# Patient Record
Sex: FEMALE | Race: BLACK OR AFRICAN AMERICAN | NOT HISPANIC OR LATINO
[De-identification: names, ages, dates, MRNs, and addresses within clinical notes are randomized per-mention and may not be internally consistent; named-entity substitution may affect disease eponyms.]

---

## 2019-05-09 ENCOUNTER — APPOINTMENT (OUTPATIENT)
Dept: OBGYN | Facility: CLINIC | Age: 34
End: 2019-05-09
Payer: COMMERCIAL

## 2019-05-09 VITALS
BODY MASS INDEX: 33.27 KG/M2 | SYSTOLIC BLOOD PRESSURE: 110 MMHG | HEIGHT: 66 IN | WEIGHT: 207 LBS | DIASTOLIC BLOOD PRESSURE: 80 MMHG

## 2019-05-09 PROCEDURE — 99395 PREV VISIT EST AGE 18-39: CPT

## 2019-05-09 NOTE — PHYSICAL EXAM
[Alert] : alert [Acute Distress] : no acute distress [Awake] : awake [Nipple Discharge] : no nipple discharge [Mass] : no breast mass [Axillary LAD] : no axillary lymphadenopathy [Tender] : non tender [Soft] : soft [Oriented x3] : oriented to person, place, and time [Normal] : uterus [No Bleeding] : there was no active vaginal bleeding [IUD String] : had an IUD string protruding out [Uterine Adnexae] : were not tender and not enlarged

## 2019-05-09 NOTE — HISTORY OF PRESENT ILLNESS
[Definite:  ___ (Date)] : the last menstrual period was [unfilled] [Normal Amount/Duration] : was of a normal amount and duration [Regular Cycle Intervals] : periods have been regular [Contraception] : uses contraception [IUD] : has an intrauterine device [1 Year Ago] : 1 year ago [Healthy Diet] : a healthy diet [Good] : being in good health [Regular Exercise] : regular exercise [Menstrual Problems] : reports normal menses [Weight Concerns] : no concerns with her weight [Up to Date] : up to date with ~his/her~ STD screening [de-identified] : paragard [Spotting Between  Menses] : no spotting between menses

## 2019-05-14 LAB — CYTOLOGY CVX/VAG DOC THIN PREP: NORMAL

## 2019-06-09 ENCOUNTER — TRANSCRIPTION ENCOUNTER (OUTPATIENT)
Age: 34
End: 2019-06-09

## 2020-10-12 ENCOUNTER — APPOINTMENT (OUTPATIENT)
Dept: OBGYN | Facility: CLINIC | Age: 35
End: 2020-10-12
Payer: COMMERCIAL

## 2020-10-12 VITALS
HEIGHT: 66 IN | WEIGHT: 193 LBS | SYSTOLIC BLOOD PRESSURE: 112 MMHG | BODY MASS INDEX: 31.02 KG/M2 | DIASTOLIC BLOOD PRESSURE: 70 MMHG

## 2020-10-12 PROCEDURE — 99213 OFFICE O/P EST LOW 20 MIN: CPT

## 2020-10-12 NOTE — HISTORY OF PRESENT ILLNESS
[TextBox_4] : 35 yo pt presents for 4 days of "pelvic pinching" and spotting. Paragard IUD in place. Has not checked the strings. Denies painful intercourse.

## 2020-10-12 NOTE — PLAN
[FreeTextEntry1] : IUD in place patient reassured. Will continue to monitor symptoms and rto if not improved, all questions answered

## 2021-05-21 ENCOUNTER — NON-APPOINTMENT (OUTPATIENT)
Age: 36
End: 2021-05-21

## 2021-05-21 ENCOUNTER — APPOINTMENT (OUTPATIENT)
Dept: OBGYN | Facility: CLINIC | Age: 36
End: 2021-05-21
Payer: COMMERCIAL

## 2021-05-21 VITALS
WEIGHT: 195.88 LBS | BODY MASS INDEX: 31.62 KG/M2 | DIASTOLIC BLOOD PRESSURE: 60 MMHG | SYSTOLIC BLOOD PRESSURE: 100 MMHG

## 2021-05-21 PROCEDURE — 99072 ADDL SUPL MATRL&STAF TM PHE: CPT

## 2021-05-21 PROCEDURE — 99395 PREV VISIT EST AGE 18-39: CPT

## 2021-05-21 NOTE — HISTORY OF PRESENT ILLNESS
[Patient reported PAP Smear was normal] : Patient reported PAP Smear was normal [Normal Amount/Duration] :  normal amount and duration [Regular Cycle Intervals] : periods have been regular [Frequency: Q ___ days] : menstrual periods occur approximately every [unfilled] days [Menstrual Cramps] : menstrual cramps [Currently Active] : currently active [Men] : men [No] : No [Yes] : Yes [PapSmeardate] : 5/9/2019 [FreeTextEntry1] : 5/9/2021 [FreeTextEntry3] : Paragard 10 IUD

## 2021-05-23 LAB — HPV HIGH+LOW RISK DNA PNL CVX: NOT DETECTED

## 2021-05-25 LAB — CYTOLOGY CVX/VAG DOC THIN PREP: NORMAL

## 2022-04-18 ENCOUNTER — APPOINTMENT (OUTPATIENT)
Dept: OBGYN | Facility: CLINIC | Age: 37
End: 2022-04-18
Payer: COMMERCIAL

## 2022-04-18 VITALS
BODY MASS INDEX: 33.25 KG/M2 | OXYGEN SATURATION: 98 % | WEIGHT: 206 LBS | HEART RATE: 87 BPM | DIASTOLIC BLOOD PRESSURE: 89 MMHG | SYSTOLIC BLOOD PRESSURE: 130 MMHG

## 2022-04-18 DIAGNOSIS — Z30.431 ENCOUNTER FOR ROUTINE CHECKING OF INTRAUTERINE CONTRACEPTIVE DEVICE: ICD-10-CM

## 2022-04-18 DIAGNOSIS — Z30.430 ENCOUNTER FOR INSERTION OF INTRAUTERINE CONTRACEPTIVE DEVICE: ICD-10-CM

## 2022-04-18 DIAGNOSIS — Z01.419 ENCOUNTER FOR GYNECOLOGICAL EXAMINATION (GENERAL) (ROUTINE) W/OUT ABNORMAL FINDINGS: ICD-10-CM

## 2022-04-18 PROCEDURE — 99395 PREV VISIT EST AGE 18-39: CPT

## 2022-04-18 NOTE — HISTORY OF PRESENT ILLNESS
[Patient reported PAP Smear was normal] : Patient reported PAP Smear was normal [Frequency: Q ___ days] : menstrual periods occur approximately every [unfilled] days [Menstrual Cramps] : menstrual cramps [Currently Active] : currently active [Men] : men [PapSmeardate] : 05/21/2021 [FreeTextEntry1] : 04/01/2022 [No] : Patient does not have concerns regarding sex

## 2022-04-18 NOTE — COUNSELING
[Breast Self Exam] : breast self exam [Preconception Care/ Fertility options] : preconception care, fertility options [Pregnancy Options] : pregnancy options

## 2022-04-20 LAB — HPV HIGH+LOW RISK DNA PNL CVX: NOT DETECTED

## 2022-04-25 LAB — CYTOLOGY CVX/VAG DOC THIN PREP: NORMAL

## 2022-04-28 ENCOUNTER — APPOINTMENT (OUTPATIENT)
Dept: OBGYN | Facility: CLINIC | Age: 37
End: 2022-04-28

## 2022-05-02 ENCOUNTER — APPOINTMENT (OUTPATIENT)
Dept: OBGYN | Facility: CLINIC | Age: 37
End: 2022-05-02

## 2022-05-03 LAB
ESTRADIOL SERPL-MCNC: 58 PG/ML
FSH SERPL-MCNC: 5.7 IU/L
HCG SERPL-MCNC: <1 MIU/ML
LH SERPL-ACNC: 2.9 IU/L
PROGEST SERPL-MCNC: 0.2 NG/ML
PROLACTIN SERPL-MCNC: 18.1 NG/ML
T3 SERPL-MCNC: 99 NG/DL
T3FREE SERPL-MCNC: 2.34 PG/ML
T4 FREE SERPL-MCNC: 1 NG/DL
T4 SERPL-MCNC: 6.5 UG/DL
TSH SERPL-ACNC: 1.32 UIU/ML

## 2022-05-09 DIAGNOSIS — Z31.9 ENCOUNTER FOR PROCREATIVE MANAGEMENT, UNSPECIFIED: ICD-10-CM

## 2022-05-09 LAB — ANTI-MUELLERIAN HORMONE: 0.69 NG/ML

## 2022-06-14 ENCOUNTER — LABORATORY RESULT (OUTPATIENT)
Age: 37
End: 2022-06-14

## 2022-06-14 ENCOUNTER — APPOINTMENT (OUTPATIENT)
Dept: OBGYN | Facility: CLINIC | Age: 37
End: 2022-06-14
Payer: COMMERCIAL

## 2022-06-14 ENCOUNTER — NON-APPOINTMENT (OUTPATIENT)
Age: 37
End: 2022-06-14

## 2022-06-14 VITALS
HEART RATE: 74 BPM | OXYGEN SATURATION: 100 % | DIASTOLIC BLOOD PRESSURE: 77 MMHG | SYSTOLIC BLOOD PRESSURE: 111 MMHG | WEIGHT: 204 LBS | BODY MASS INDEX: 32.93 KG/M2

## 2022-06-14 PROCEDURE — 0500F INITIAL PRENATAL CARE VISIT: CPT

## 2022-06-14 NOTE — HISTORY OF PRESENT ILLNESS
[N] : Patient does not use contraception [Monogamous (Male Partner)] : is monogamous with a male partner [Y] : Positive pregnancy history [FreeTextEntry1] : Confirmation of pregnancy\par LMP:4/1/2022\par OCHOA:2/2/2023\par 6w5d\par \par Patient went to ER on 6/12/2022 \par for vaginal bleeding and states a confirmation of pregnancy\par 8w6d a fibroid and small hematoma [LMPDate] : 4/1/2022 [PGHxTotal] : 2 [[facility] : [facility]

## 2022-06-16 LAB
ABO + RH PNL BLD: NORMAL
BACTERIA UR CULT: NORMAL
BASOPHILS # BLD AUTO: 0.02 K/UL
BASOPHILS NFR BLD AUTO: 0.3 %
BLD GP AB SCN SERPL QL: NORMAL
C TRACH RRNA SPEC QL NAA+PROBE: NOT DETECTED
CMV IGG SERPL QL: <0.2 U/ML
CMV IGG SERPL-IMP: NEGATIVE
CMV IGM SERPL QL: <8 AU/ML
CMV IGM SERPL QL: NEGATIVE
EOSINOPHIL # BLD AUTO: 0.02 K/UL
EOSINOPHIL NFR BLD AUTO: 0.3 %
HBV SURFACE AG SER QL: NONREACTIVE
HCT VFR BLD CALC: 39.1 %
HCV AB SER QL: NONREACTIVE
HCV S/CO RATIO: 0.17 S/CO
HGB BLD-MCNC: 13.2 G/DL
HIV1+2 AB SPEC QL IA.RAPID: NONREACTIVE
HSV 1+2 IGG SER IA-IMP: POSITIVE
HSV 1+2 IGG SER IA-IMP: POSITIVE
HSV1 IGG SER QL: 5.84 INDEX
HSV2 IGG SER QL: 10.2 INDEX
IMM GRANULOCYTES NFR BLD AUTO: 0.3 %
LYMPHOCYTES # BLD AUTO: 1.22 K/UL
LYMPHOCYTES NFR BLD AUTO: 19.9 %
MAN DIFF?: NORMAL
MCHC RBC-ENTMCNC: 27.4 PG
MCHC RBC-ENTMCNC: 33.8 GM/DL
MCV RBC AUTO: 81.3 FL
MEV IGG FLD QL IA: 104 AU/ML
MEV IGG+IGM SER-IMP: POSITIVE
MONOCYTES # BLD AUTO: 0.37 K/UL
MONOCYTES NFR BLD AUTO: 6 %
N GONORRHOEA RRNA SPEC QL NAA+PROBE: NOT DETECTED
NEUTROPHILS # BLD AUTO: 4.48 K/UL
NEUTROPHILS NFR BLD AUTO: 73.2 %
PLATELET # BLD AUTO: NORMAL K/UL
RBC # BLD: 4.81 M/UL
RBC # FLD: 14 %
RUBV IGG FLD-ACNC: 3.8 INDEX
RUBV IGG SER-IMP: POSITIVE
RUBV IGM FLD-ACNC: <20 AU/ML
SOURCE AMPLIFICATION: NORMAL
T GONDII AB SER-IMP: NEGATIVE
T GONDII AB SER-IMP: NEGATIVE
T GONDII IGG SER QL: <3 IU/ML
T GONDII IGM SER QL: <3 AU/ML
T PALLIDUM AB SER QL IA: NEGATIVE
TSH SERPL-ACNC: 0.49 UIU/ML
VZV AB TITR SER: POSITIVE
VZV IGG SER IF-ACNC: 1432 INDEX
WBC # FLD AUTO: 6.13 K/UL

## 2022-06-17 LAB
MEV IGM SER QL: <0.91 ISR
VZV IGM SER IF-ACNC: <0.91 INDEX

## 2022-06-20 LAB
B19V IGG SER QL IA: 4.77 INDEX
B19V IGG+IGM SER-IMP: NORMAL
B19V IGG+IGM SER-IMP: POSITIVE
B19V IGM FLD-ACNC: 0.2 INDEX
B19V IGM SER-ACNC: NEGATIVE
FMR1 GENE MUT ANL BLD/T: NORMAL
HGB A MFR BLD: 60.1 %
HGB A2 MFR BLD: 3.1 %
HGB C MFR BLD: 36.8 %
HGB FRACT BLD-IMP: NORMAL
HGB S BLD QL SOLY: NEGATIVE

## 2022-06-23 LAB
AR GENE MUT ANL BLD/T: NORMAL
HSV1 IGM SER QL: NEGATIVE
HSV2 AB FLD-ACNC: NEGATIVE

## 2022-06-24 LAB — CFTR MUT TESTED BLD/T: NEGATIVE

## 2022-06-28 ENCOUNTER — APPOINTMENT (OUTPATIENT)
Dept: OBGYN | Facility: CLINIC | Age: 37
End: 2022-06-28

## 2022-06-29 ENCOUNTER — APPOINTMENT (OUTPATIENT)
Dept: OBGYN | Facility: CLINIC | Age: 37
End: 2022-06-29
Payer: COMMERCIAL

## 2022-06-29 VITALS
WEIGHT: 203.63 LBS | SYSTOLIC BLOOD PRESSURE: 103 MMHG | BODY MASS INDEX: 32.87 KG/M2 | OXYGEN SATURATION: 97 % | DIASTOLIC BLOOD PRESSURE: 75 MMHG

## 2022-06-29 DIAGNOSIS — Z34.90 ENCOUNTER FOR SUPERVISION OF NORMAL PREGNANCY, UNSPECIFIED, UNSPECIFIED TRIMESTER: ICD-10-CM

## 2022-06-29 PROCEDURE — 0502F SUBSEQUENT PRENATAL CARE: CPT

## 2022-06-29 NOTE — PLAN
[FreeTextEntry1] : Patient counseled on vitamin B6 and Unisom for nausea and is to RTO in 1 week for NIPT. \par \par All questions answered and patient is in agreement and understanding of plan.

## 2022-06-29 NOTE — PROCEDURE
[Transabdominal OB Sonogram] : Transabdominal OB Sonogram [Intrauterine Pregnancy] : intrauterine pregnancy [Fetal Heart] : fetal heart present [Current GA by Sonogram: ___ (wks)] : Current GA by Sonogram: [unfilled]Uwks [___ day(s)] : [unfilled] days [Transabdominal OB Sonogram WNL] : Transabdominal OB Sonogram WNL [FreeTextEntry1] : FHR: 173

## 2022-06-29 NOTE — HISTORY OF PRESENT ILLNESS
[FreeTextEntry1] : Ms. Spike Kan presents for follow up of pregnancy. \par \par LMP: 04/04/2022\par \par Patient complaints of intermittent cramping, daily one time light pink spotting as well as nausea and vomitting in pregnancy.

## 2022-07-06 ENCOUNTER — APPOINTMENT (OUTPATIENT)
Dept: OBGYN | Facility: CLINIC | Age: 37
End: 2022-07-06

## 2022-07-18 ENCOUNTER — LABORATORY RESULT (OUTPATIENT)
Age: 37
End: 2022-07-18

## 2022-07-18 ENCOUNTER — ASOB RESULT (OUTPATIENT)
Age: 37
End: 2022-07-18

## 2022-07-18 ENCOUNTER — APPOINTMENT (OUTPATIENT)
Dept: ANTEPARTUM | Facility: CLINIC | Age: 37
End: 2022-07-18

## 2022-07-18 PROCEDURE — 76813 OB US NUCHAL MEAS 1 GEST: CPT

## 2022-07-18 PROCEDURE — 76801 OB US < 14 WKS SINGLE FETUS: CPT

## 2022-07-28 ENCOUNTER — APPOINTMENT (OUTPATIENT)
Dept: OBGYN | Facility: CLINIC | Age: 37
End: 2022-07-28

## 2022-08-02 ENCOUNTER — APPOINTMENT (OUTPATIENT)
Dept: OBGYN | Facility: CLINIC | Age: 37
End: 2022-08-02

## 2022-08-12 ENCOUNTER — APPOINTMENT (OUTPATIENT)
Dept: ANTEPARTUM | Facility: CLINIC | Age: 37
End: 2022-08-12

## 2022-08-12 ENCOUNTER — ASOB RESULT (OUTPATIENT)
Age: 37
End: 2022-08-12

## 2022-08-12 ENCOUNTER — NON-APPOINTMENT (OUTPATIENT)
Age: 37
End: 2022-08-12

## 2022-08-12 PROCEDURE — 76811 OB US DETAILED SNGL FETUS: CPT

## 2022-08-12 PROCEDURE — 76817 TRANSVAGINAL US OBSTETRIC: CPT | Mod: 59

## 2022-08-23 ENCOUNTER — APPOINTMENT (OUTPATIENT)
Dept: ANTEPARTUM | Facility: CLINIC | Age: 37
End: 2022-08-23

## 2022-08-23 ENCOUNTER — ASOB RESULT (OUTPATIENT)
Age: 37
End: 2022-08-23

## 2022-08-23 PROCEDURE — 76946 ECHO GUIDE FOR AMNIOCENTESIS: CPT

## 2022-08-23 PROCEDURE — 59000 AMNIOCENTESIS DIAGNOSTIC: CPT

## 2022-09-08 ENCOUNTER — APPOINTMENT (OUTPATIENT)
Dept: OBGYN | Facility: CLINIC | Age: 37
End: 2022-09-08

## 2022-09-12 ENCOUNTER — APPOINTMENT (OUTPATIENT)
Dept: OBGYN | Facility: CLINIC | Age: 37
End: 2022-09-12

## 2022-09-12 VITALS — DIASTOLIC BLOOD PRESSURE: 67 MMHG | SYSTOLIC BLOOD PRESSURE: 97 MMHG | WEIGHT: 203 LBS | BODY MASS INDEX: 32.77 KG/M2

## 2022-09-12 PROCEDURE — 0502F SUBSEQUENT PRENATAL CARE: CPT

## 2022-09-16 ENCOUNTER — ASOB RESULT (OUTPATIENT)
Age: 37
End: 2022-09-16

## 2022-09-16 ENCOUNTER — APPOINTMENT (OUTPATIENT)
Dept: ANTEPARTUM | Facility: CLINIC | Age: 37
End: 2022-09-16

## 2022-09-16 PROCEDURE — 76816 OB US FOLLOW-UP PER FETUS: CPT

## 2022-10-03 ENCOUNTER — NON-APPOINTMENT (OUTPATIENT)
Age: 37
End: 2022-10-03

## 2022-10-03 ENCOUNTER — APPOINTMENT (OUTPATIENT)
Dept: OBGYN | Facility: CLINIC | Age: 37
End: 2022-10-03

## 2022-10-03 VITALS
HEIGHT: 66 IN | SYSTOLIC BLOOD PRESSURE: 121 MMHG | BODY MASS INDEX: 33.59 KG/M2 | DIASTOLIC BLOOD PRESSURE: 72 MMHG | WEIGHT: 209 LBS | OXYGEN SATURATION: 99 %

## 2022-10-03 DIAGNOSIS — G93.2 BENIGN INTRACRANIAL HYPERTENSION: ICD-10-CM

## 2022-10-03 PROCEDURE — 0502F SUBSEQUENT PRENATAL CARE: CPT

## 2022-10-24 ENCOUNTER — NON-APPOINTMENT (OUTPATIENT)
Age: 37
End: 2022-10-24

## 2022-10-25 ENCOUNTER — APPOINTMENT (OUTPATIENT)
Dept: MATERNAL FETAL MEDICINE | Facility: CLINIC | Age: 37
End: 2022-10-25

## 2022-10-25 ENCOUNTER — LABORATORY RESULT (OUTPATIENT)
Age: 37
End: 2022-10-25

## 2022-10-25 ENCOUNTER — APPOINTMENT (OUTPATIENT)
Dept: MATERNAL FETAL MEDICINE | Facility: CLINIC | Age: 37
End: 2022-10-25
Payer: COMMERCIAL

## 2022-10-25 VITALS
SYSTOLIC BLOOD PRESSURE: 117 MMHG | WEIGHT: 212 LBS | BODY MASS INDEX: 34.22 KG/M2 | DIASTOLIC BLOOD PRESSURE: 62 MMHG | OXYGEN SATURATION: 99 %

## 2022-10-25 PROCEDURE — 99205 OFFICE O/P NEW HI 60 MIN: CPT

## 2022-10-26 LAB
APPEARANCE: CLEAR
BASOPHILS # BLD AUTO: 0.03 K/UL
BASOPHILS NFR BLD AUTO: 0.4 %
BILIRUBIN URINE: NEGATIVE
BLOOD URINE: NEGATIVE
COLOR: NORMAL
EOSINOPHIL # BLD AUTO: 0.11 K/UL
EOSINOPHIL NFR BLD AUTO: 1.3 %
GLUCOSE 1H P 50 G GLC PO SERPL-MCNC: 75 MG/DL
GLUCOSE QUALITATIVE U: NEGATIVE
HCT VFR BLD CALC: 32.8 %
HGB BLD-MCNC: 11 G/DL
IMM GRANULOCYTES NFR BLD AUTO: 0.7 %
KETONES URINE: NEGATIVE
LEUKOCYTE ESTERASE URINE: NEGATIVE
LYMPHOCYTES # BLD AUTO: 1.24 K/UL
LYMPHOCYTES NFR BLD AUTO: 15.2 %
MAN DIFF?: NORMAL
MCHC RBC-ENTMCNC: 28.4 PG
MCHC RBC-ENTMCNC: 33.5 GM/DL
MCV RBC AUTO: 84.8 FL
MONOCYTES # BLD AUTO: 0.64 K/UL
MONOCYTES NFR BLD AUTO: 7.8 %
NEUTROPHILS # BLD AUTO: 6.09 K/UL
NEUTROPHILS NFR BLD AUTO: 74.6 %
NITRITE URINE: NEGATIVE
PH URINE: 8
PLATELET # BLD AUTO: NORMAL K/UL
PROTEIN URINE: NORMAL
RBC # BLD: 3.87 M/UL
RBC # FLD: 13.3 %
SPECIFIC GRAVITY URINE: 1.02
T PALLIDUM AB SER QL IA: NEGATIVE
UROBILINOGEN URINE: NORMAL
WBC # FLD AUTO: 8.17 K/UL

## 2022-10-27 LAB — BACTERIA UR CULT: NORMAL

## 2022-10-31 NOTE — HISTORY OF PRESENT ILLNESS
[FreeTextEntry1] : Maternal-Fetal Medicine consultation \par Prenatal care: Dr. Camacho\par Neuro-ophtho ([facility]): Dr. Castelan [phone] \par \par Chief compliant: Pseudotumor cerebri\par \par ALIDA BRODERICK is a 36 year  at 26w4d (OCHOA 23) that presents for a MFM consultation. \par \par Obstetric history:\par 16 39 weeks, , unmedicated, male, does not recall BW (< 9#)\par \par Medical history notable for pseudotumor cerebri, dx 2020, noted L visual sx and increased weight gain, followed up again 2020 and  shunt placed, optic nerve sheath fenestration R eye (all at [facility]) due to deteriorating R eye vision and persistent increased intracranial pressure. Surgical history is otherwise unremarkable. Gynecologic history is unremarkable. \par \par Current medications include prenatal vitamins. 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 She lives with her  and son. She denies a history of tobacco use. She denies alcohol or drug use in this pregnancy.  She has never received blood products but is willing to receive them if needed. \par \par Family history is unremarkable for her parents. Sister has asthma, other 4 siblings healthy. She denies a family history of birth defects, mental retardation, developmental delay or genetic disorders. Except maybe MR/DD in husbands nephew.  also has a h/o 2 first degree relatives with possible autism.\par \par Height: 5'5" Prepregnancy weight: ~195#\par Prepregnancy BMI: 32.4

## 2022-10-31 NOTE — DISCUSSION/SUMMARY
[FreeTextEntry1] : 1. Idiopathic intracranial hypertension (IIH)\par IIH is a disease particularly affecting young obese patients of child-bearing age. IIH can occur at any trimester during pregnancy. There is no increase in maternal, fetal, or  mortality or morbidity in pregnant women diagnosed with IIH. Visual outcome is the same for nonpregnant patients with IIH. Acetazolamide is the mainstay of medical therapy for IIH. No evidence has been found with respect to the adverse effects of acetazolamide use during pregnancy. This medication can be taken during pregnancy but with caution and justification.\par \par In general, IIH itself is not an indication for a  delivery which is recommended based on the usual obstetric indications. Active labor does not generally place a patient with IIH at a high risk for visual loss. However, given the history of optic nerve sheath fenestration and  shunt placement and anesthesia consult in the 3rd trimester was recommended. Dr. Castelan, the patient's neuro-ophthalmologist was contacted. He was in agreement that the patient could safely pursue a trial of labor and there is no contraindication to Valsalva.\par \par 2. Labor and delivery\par The patient has many questions about labor and delivery including pain-relief options, indications for , when to present to the hospital and delayed cord clamping which were all reviewed. \par \par \par Recommendations:\par 1. Reconsult MFM if persistent or unusual HAs or if visual changes\par 2. Routine prenatal care with  for the usual obstetric indications. It is safe to Valsalva.\par 3. Anesthesia consult in the 3rd trimester

## 2022-11-04 ENCOUNTER — APPOINTMENT (OUTPATIENT)
Dept: ANTEPARTUM | Facility: CLINIC | Age: 37
End: 2022-11-04

## 2022-11-04 ENCOUNTER — ASOB RESULT (OUTPATIENT)
Age: 37
End: 2022-11-04

## 2022-11-04 PROCEDURE — 76816 OB US FOLLOW-UP PER FETUS: CPT

## 2022-11-04 PROCEDURE — 76819 FETAL BIOPHYS PROFIL W/O NST: CPT | Mod: 59

## 2022-11-15 ENCOUNTER — NON-APPOINTMENT (OUTPATIENT)
Age: 37
End: 2022-11-15

## 2022-11-28 ENCOUNTER — APPOINTMENT (OUTPATIENT)
Dept: OBGYN | Facility: CLINIC | Age: 37
End: 2022-11-28

## 2022-11-28 VITALS
SYSTOLIC BLOOD PRESSURE: 114 MMHG | BODY MASS INDEX: 34.22 KG/M2 | OXYGEN SATURATION: 97 % | WEIGHT: 212 LBS | HEART RATE: 92 BPM | DIASTOLIC BLOOD PRESSURE: 74 MMHG

## 2022-11-28 DIAGNOSIS — N89.8 OTHER SPECIFIED NONINFLAMMATORY DISORDERS OF VAGINA: ICD-10-CM

## 2022-11-28 PROCEDURE — 0502F SUBSEQUENT PRENATAL CARE: CPT

## 2022-11-29 LAB
CANDIDA VAG CYTO: NOT DETECTED
G VAGINALIS+PREV SP MTYP VAG QL MICRO: NOT DETECTED
T VAGINALIS VAG QL WET PREP: NOT DETECTED

## 2022-12-05 ENCOUNTER — APPOINTMENT (OUTPATIENT)
Dept: ANTEPARTUM | Facility: CLINIC | Age: 37
End: 2022-12-05

## 2022-12-15 ENCOUNTER — NON-APPOINTMENT (OUTPATIENT)
Age: 37
End: 2022-12-15

## 2022-12-15 ENCOUNTER — APPOINTMENT (OUTPATIENT)
Dept: ANTEPARTUM | Facility: CLINIC | Age: 37
End: 2022-12-15

## 2022-12-15 ENCOUNTER — APPOINTMENT (OUTPATIENT)
Dept: OBGYN | Facility: CLINIC | Age: 37
End: 2022-12-15

## 2022-12-15 ENCOUNTER — ASOB RESULT (OUTPATIENT)
Age: 37
End: 2022-12-15

## 2022-12-15 VITALS
OXYGEN SATURATION: 100 % | DIASTOLIC BLOOD PRESSURE: 78 MMHG | HEART RATE: 98 BPM | SYSTOLIC BLOOD PRESSURE: 118 MMHG | WEIGHT: 212 LBS | BODY MASS INDEX: 34.22 KG/M2

## 2022-12-15 PROCEDURE — 76819 FETAL BIOPHYS PROFIL W/O NST: CPT

## 2022-12-15 PROCEDURE — 76820 UMBILICAL ARTERY ECHO: CPT | Mod: 59

## 2022-12-15 PROCEDURE — 0502F SUBSEQUENT PRENATAL CARE: CPT

## 2022-12-15 PROCEDURE — 76816 OB US FOLLOW-UP PER FETUS: CPT

## 2022-12-29 ENCOUNTER — APPOINTMENT (OUTPATIENT)
Dept: ANTEPARTUM | Facility: CLINIC | Age: 37
End: 2022-12-29

## 2023-01-05 ENCOUNTER — APPOINTMENT (OUTPATIENT)
Dept: OBGYN | Facility: CLINIC | Age: 38
End: 2023-01-05
Payer: COMMERCIAL

## 2023-01-05 VITALS
HEART RATE: 101 BPM | DIASTOLIC BLOOD PRESSURE: 74 MMHG | SYSTOLIC BLOOD PRESSURE: 111 MMHG | WEIGHT: 215 LBS | BODY MASS INDEX: 34.7 KG/M2 | OXYGEN SATURATION: 98 %

## 2023-01-05 PROCEDURE — 0502F SUBSEQUENT PRENATAL CARE: CPT

## 2023-01-09 LAB — B-HEM STREP SPEC QL CULT: NORMAL

## 2023-01-12 ENCOUNTER — APPOINTMENT (OUTPATIENT)
Dept: ANTEPARTUM | Facility: CLINIC | Age: 38
End: 2023-01-12
Payer: COMMERCIAL

## 2023-01-12 ENCOUNTER — ASOB RESULT (OUTPATIENT)
Age: 38
End: 2023-01-12

## 2023-01-12 ENCOUNTER — APPOINTMENT (OUTPATIENT)
Dept: OBGYN | Facility: CLINIC | Age: 38
End: 2023-01-12
Payer: COMMERCIAL

## 2023-01-12 VITALS
SYSTOLIC BLOOD PRESSURE: 111 MMHG | BODY MASS INDEX: 34.54 KG/M2 | WEIGHT: 214 LBS | OXYGEN SATURATION: 100 % | HEART RATE: 90 BPM | DIASTOLIC BLOOD PRESSURE: 77 MMHG

## 2023-01-12 PROCEDURE — 76818 FETAL BIOPHYS PROFILE W/NST: CPT

## 2023-01-12 PROCEDURE — 76816 OB US FOLLOW-UP PER FETUS: CPT

## 2023-01-12 PROCEDURE — 0502F SUBSEQUENT PRENATAL CARE: CPT

## 2023-01-14 ENCOUNTER — NON-APPOINTMENT (OUTPATIENT)
Age: 38
End: 2023-01-14

## 2023-01-14 ENCOUNTER — TRANSCRIPTION ENCOUNTER (OUTPATIENT)
Age: 38
End: 2023-01-14

## 2023-01-14 ENCOUNTER — INPATIENT (INPATIENT)
Facility: HOSPITAL | Age: 38
LOS: 2 days | Discharge: HOME | End: 2023-01-17
Attending: OBSTETRICS & GYNECOLOGY | Admitting: OBSTETRICS & GYNECOLOGY
Payer: COMMERCIAL

## 2023-01-14 VITALS — HEIGHT: 65 IN | WEIGHT: 212.97 LBS

## 2023-01-14 DIAGNOSIS — O26.899 OTHER SPECIFIED PREGNANCY RELATED CONDITIONS, UNSPECIFIED TRIMESTER: ICD-10-CM

## 2023-01-14 DIAGNOSIS — Z3A.00 WEEKS OF GESTATION OF PREGNANCY NOT SPECIFIED: ICD-10-CM

## 2023-01-14 LAB
BASOPHILS # BLD AUTO: 0.02 K/UL — SIGNIFICANT CHANGE UP (ref 0–0.2)
BASOPHILS NFR BLD AUTO: 0.3 % — SIGNIFICANT CHANGE UP (ref 0–2)
BLD GP AB SCN SERPL QL: NEGATIVE — SIGNIFICANT CHANGE UP
EOSINOPHIL # BLD AUTO: 0.04 K/UL — SIGNIFICANT CHANGE UP (ref 0–0.5)
EOSINOPHIL NFR BLD AUTO: 0.6 % — SIGNIFICANT CHANGE UP (ref 0–6)
HCT VFR BLD CALC: 30 % — LOW (ref 34.5–45)
HGB BLD-MCNC: 10.3 G/DL — LOW (ref 11.5–15.5)
IMM GRANULOCYTES NFR BLD AUTO: 0.8 % — SIGNIFICANT CHANGE UP (ref 0–0.9)
LYMPHOCYTES # BLD AUTO: 1.38 K/UL — SIGNIFICANT CHANGE UP (ref 1–3.3)
LYMPHOCYTES # BLD AUTO: 21 % — SIGNIFICANT CHANGE UP (ref 13–44)
MCHC RBC-ENTMCNC: 26.9 PG — LOW (ref 27–34)
MCHC RBC-ENTMCNC: 34.3 GM/DL — SIGNIFICANT CHANGE UP (ref 32–36)
MCV RBC AUTO: 78.3 FL — LOW (ref 80–100)
MONOCYTES # BLD AUTO: 0.73 K/UL — SIGNIFICANT CHANGE UP (ref 0–0.9)
MONOCYTES NFR BLD AUTO: 11.1 % — SIGNIFICANT CHANGE UP (ref 2–14)
NEUTROPHILS # BLD AUTO: 4.34 K/UL — SIGNIFICANT CHANGE UP (ref 1.8–7.4)
NEUTROPHILS NFR BLD AUTO: 66.2 % — SIGNIFICANT CHANGE UP (ref 43–77)
NRBC # BLD: 0 /100 WBCS — SIGNIFICANT CHANGE UP (ref 0–0)
PLATELET # BLD AUTO: 204 K/UL — SIGNIFICANT CHANGE UP (ref 150–400)
RBC # BLD: 3.83 M/UL — SIGNIFICANT CHANGE UP (ref 3.8–5.2)
RBC # FLD: 13 % — SIGNIFICANT CHANGE UP (ref 10.3–14.5)
RH IG SCN BLD-IMP: POSITIVE — SIGNIFICANT CHANGE UP
RH IG SCN BLD-IMP: POSITIVE — SIGNIFICANT CHANGE UP
SARS-COV-2 RNA SPEC QL NAA+PROBE: SIGNIFICANT CHANGE UP
WBC # BLD: 6.56 K/UL — SIGNIFICANT CHANGE UP (ref 3.8–10.5)
WBC # FLD AUTO: 6.56 K/UL — SIGNIFICANT CHANGE UP (ref 3.8–10.5)

## 2023-01-14 RX ORDER — OXYTOCIN 10 UNIT/ML
333.33 VIAL (ML) INJECTION
Qty: 20 | Refills: 0 | Status: DISCONTINUED | OUTPATIENT
Start: 2023-01-14 | End: 2023-01-15

## 2023-01-14 RX ORDER — SODIUM CHLORIDE 9 MG/ML
1000 INJECTION, SOLUTION INTRAVENOUS
Refills: 0 | Status: DISCONTINUED | OUTPATIENT
Start: 2023-01-14 | End: 2023-01-15

## 2023-01-14 RX ORDER — CHLORHEXIDINE GLUCONATE 213 G/1000ML
1 SOLUTION TOPICAL ONCE
Refills: 0 | Status: DISCONTINUED | OUTPATIENT
Start: 2023-01-14 | End: 2023-01-15

## 2023-01-14 RX ORDER — OXYTOCIN 10 UNIT/ML
2 VIAL (ML) INJECTION
Qty: 30 | Refills: 0 | Status: DISCONTINUED | OUTPATIENT
Start: 2023-01-14 | End: 2023-01-15

## 2023-01-14 RX ORDER — CITRIC ACID/SODIUM CITRATE 300-500 MG
15 SOLUTION, ORAL ORAL EVERY 6 HOURS
Refills: 0 | Status: DISCONTINUED | OUTPATIENT
Start: 2023-01-14 | End: 2023-01-15

## 2023-01-14 RX ORDER — LOPERAMIDE HCL 2 MG
2 TABLET ORAL ONCE
Refills: 0 | Status: DISCONTINUED | OUTPATIENT
Start: 2023-01-14 | End: 2023-01-15

## 2023-01-14 RX ORDER — FENTANYL/BUPIVACAINE/NS/PF 2MCG/ML-.1
250 PLASTIC BAG, INJECTION (ML) INJECTION
Refills: 0 | Status: DISCONTINUED | OUTPATIENT
Start: 2023-01-14 | End: 2023-01-15

## 2023-01-14 RX ADMIN — SODIUM CHLORIDE 125 MILLILITER(S): 9 INJECTION, SOLUTION INTRAVENOUS at 19:06

## 2023-01-14 RX ADMIN — Medication 2 MILLIUNIT(S)/MIN: at 19:00

## 2023-01-14 RX ADMIN — Medication 250 MILLILITER(S): at 22:47

## 2023-01-14 RX ADMIN — SODIUM CHLORIDE 125 MILLILITER(S): 9 INJECTION, SOLUTION INTRAVENOUS at 22:54

## 2023-01-14 RX ADMIN — SODIUM CHLORIDE 125 MILLILITER(S): 9 INJECTION, SOLUTION INTRAVENOUS at 16:20

## 2023-01-14 NOTE — PRE-ANESTHESIA EVALUATION ADULT - NSANTHPEFT_GEN_ALL_CORE
General: Appearance is consistent with chronological age. No abnormal facies.  Constitutional: Alert and in no acute distress.  Neck: The appearance of the neck was normal, no neck mass was observed. There was no jugular-venous distention.   Cardiovascular:  Rate and rhythm evaluated.  Respiratory: Unlabored breathing.  Neurological: No focal deficit, moves all extremities.  Psychiatric: Oriented to person, place, and time, insight and judgment were intact and the affect was normal.  Exercise Tolerance: MET>4.

## 2023-01-14 NOTE — PATIENT PROFILE OB - FALL HARM RISK - UNIVERSAL INTERVENTIONS
Bed in lowest position, wheels locked, appropriate side rails in place/Call bell, personal items and telephone in reach/Instruct patient to call for assistance before getting out of bed or chair/Non-slip footwear when patient is out of bed/[place] to call system/Physically safe environment - no spills, clutter or unnecessary equipment/Purposeful Proactive Rounding/Room/bathroom lighting operational, light cord in reach

## 2023-01-14 NOTE — PATIENT PROFILE OB - FUNCTIONAL ASSESSMENT - BASIC MOBILITY 6.
4-calculated by average/Not able to assess (calculate score using [facility] averaging method)

## 2023-01-15 LAB
COVID-19 SPIKE DOMAIN AB INTERP: POSITIVE
COVID-19 SPIKE DOMAIN ANTIBODY RESULT: >250 U/ML — HIGH
SARS-COV-2 IGG+IGM SERPL QL IA: >250 U/ML — HIGH
SARS-COV-2 IGG+IGM SERPL QL IA: POSITIVE
T PALLIDUM AB TITR SER: NEGATIVE — SIGNIFICANT CHANGE UP

## 2023-01-15 PROCEDURE — 59400 OBSTETRICAL CARE: CPT

## 2023-01-15 RX ORDER — DIBUCAINE 1 %
1 OINTMENT (GRAM) RECTAL EVERY 6 HOURS
Refills: 0 | Status: DISCONTINUED | OUTPATIENT
Start: 2023-01-15 | End: 2023-01-17

## 2023-01-15 RX ORDER — MAGNESIUM HYDROXIDE 400 MG/1
30 TABLET, CHEWABLE ORAL
Refills: 0 | Status: DISCONTINUED | OUTPATIENT
Start: 2023-01-15 | End: 2023-01-17

## 2023-01-15 RX ORDER — ACETAMINOPHEN 500 MG
975 TABLET ORAL
Refills: 0 | Status: DISCONTINUED | OUTPATIENT
Start: 2023-01-15 | End: 2023-01-17

## 2023-01-15 RX ORDER — KETOROLAC TROMETHAMINE 30 MG/ML
30 SYRINGE (ML) INJECTION ONCE
Refills: 0 | Status: DISCONTINUED | OUTPATIENT
Start: 2023-01-15 | End: 2023-01-15

## 2023-01-15 RX ORDER — OXYCODONE HYDROCHLORIDE 5 MG/1
5 TABLET ORAL ONCE
Refills: 0 | Status: DISCONTINUED | OUTPATIENT
Start: 2023-01-15 | End: 2023-01-17

## 2023-01-15 RX ORDER — BENZOCAINE 10 %
1 GEL (GRAM) MUCOUS MEMBRANE EVERY 6 HOURS
Refills: 0 | Status: DISCONTINUED | OUTPATIENT
Start: 2023-01-15 | End: 2023-01-17

## 2023-01-15 RX ORDER — LANOLIN
1 OINTMENT (GRAM) TOPICAL EVERY 6 HOURS
Refills: 0 | Status: DISCONTINUED | OUTPATIENT
Start: 2023-01-15 | End: 2023-01-17

## 2023-01-15 RX ORDER — HYDROCORTISONE 1 %
1 OINTMENT (GRAM) TOPICAL EVERY 6 HOURS
Refills: 0 | Status: DISCONTINUED | OUTPATIENT
Start: 2023-01-15 | End: 2023-01-17

## 2023-01-15 RX ORDER — SODIUM CHLORIDE 9 MG/ML
3 INJECTION INTRAMUSCULAR; INTRAVENOUS; SUBCUTANEOUS EVERY 8 HOURS
Refills: 0 | Status: DISCONTINUED | OUTPATIENT
Start: 2023-01-15 | End: 2023-01-17

## 2023-01-15 RX ORDER — AER TRAVELER 0.5 G/1
1 SOLUTION RECTAL; TOPICAL EVERY 4 HOURS
Refills: 0 | Status: DISCONTINUED | OUTPATIENT
Start: 2023-01-15 | End: 2023-01-17

## 2023-01-15 RX ORDER — OXYTOCIN 10 UNIT/ML
41.67 VIAL (ML) INJECTION
Qty: 20 | Refills: 0 | Status: DISCONTINUED | OUTPATIENT
Start: 2023-01-15 | End: 2023-01-17

## 2023-01-15 RX ORDER — IBUPROFEN 200 MG
600 TABLET ORAL EVERY 6 HOURS
Refills: 0 | Status: DISCONTINUED | OUTPATIENT
Start: 2023-01-15 | End: 2023-01-17

## 2023-01-15 RX ORDER — TETANUS TOXOID, REDUCED DIPHTHERIA TOXOID AND ACELLULAR PERTUSSIS VACCINE, ADSORBED 5; 2.5; 8; 8; 2.5 [IU]/.5ML; [IU]/.5ML; UG/.5ML; UG/.5ML; UG/.5ML
0.5 SUSPENSION INTRAMUSCULAR ONCE
Refills: 0 | Status: DISCONTINUED | OUTPATIENT
Start: 2023-01-15 | End: 2023-01-17

## 2023-01-15 RX ORDER — SIMETHICONE 80 MG/1
80 TABLET, CHEWABLE ORAL EVERY 4 HOURS
Refills: 0 | Status: DISCONTINUED | OUTPATIENT
Start: 2023-01-15 | End: 2023-01-17

## 2023-01-15 RX ORDER — DIPHENHYDRAMINE HCL 50 MG
25 CAPSULE ORAL EVERY 6 HOURS
Refills: 0 | Status: DISCONTINUED | OUTPATIENT
Start: 2023-01-15 | End: 2023-01-17

## 2023-01-15 RX ORDER — PRAMOXINE HYDROCHLORIDE 150 MG/15G
1 AEROSOL, FOAM RECTAL EVERY 4 HOURS
Refills: 0 | Status: DISCONTINUED | OUTPATIENT
Start: 2023-01-15 | End: 2023-01-17

## 2023-01-15 RX ORDER — OXYCODONE HYDROCHLORIDE 5 MG/1
5 TABLET ORAL
Refills: 0 | Status: DISCONTINUED | OUTPATIENT
Start: 2023-01-15 | End: 2023-01-17

## 2023-01-15 RX ORDER — IBUPROFEN 200 MG
600 TABLET ORAL EVERY 6 HOURS
Refills: 0 | Status: COMPLETED | OUTPATIENT
Start: 2023-01-15 | End: 2023-12-14

## 2023-01-15 RX ADMIN — Medication 975 MILLIGRAM(S): at 21:30

## 2023-01-15 RX ADMIN — SODIUM CHLORIDE 3 MILLILITER(S): 9 INJECTION INTRAMUSCULAR; INTRAVENOUS; SUBCUTANEOUS at 06:43

## 2023-01-15 RX ADMIN — Medication 600 MILLIGRAM(S): at 18:17

## 2023-01-15 RX ADMIN — SODIUM CHLORIDE 3 MILLILITER(S): 9 INJECTION INTRAMUSCULAR; INTRAVENOUS; SUBCUTANEOUS at 22:55

## 2023-01-15 RX ADMIN — Medication 975 MILLIGRAM(S): at 20:41

## 2023-01-15 RX ADMIN — Medication 30 MILLIGRAM(S): at 02:01

## 2023-01-15 RX ADMIN — Medication 600 MILLIGRAM(S): at 09:00

## 2023-01-15 RX ADMIN — Medication 1 TABLET(S): at 14:18

## 2023-01-15 RX ADMIN — Medication 975 MILLIGRAM(S): at 06:43

## 2023-01-15 RX ADMIN — Medication 1000 MILLIUNIT(S)/MIN: at 00:53

## 2023-01-15 RX ADMIN — Medication 975 MILLIGRAM(S): at 05:59

## 2023-01-15 RX ADMIN — Medication 975 MILLIGRAM(S): at 14:18

## 2023-01-15 RX ADMIN — SODIUM CHLORIDE 3 MILLILITER(S): 9 INJECTION INTRAMUSCULAR; INTRAVENOUS; SUBCUTANEOUS at 14:27

## 2023-01-15 NOTE — LACTATION INITIAL EVALUATION - NS LACT CON REASON FOR REQ
38.2 week baby boy seen at 14 hours of life. Voiding. Stooling. This is mother's second baby. Mother  her 7 yo for 2-2.5 yrs. Mother planned to bf this baby exclusively. At  arrival, baby was swaddled in crib. Mother has bilateral large br. Medium everted nipples, skin dry and intact. HE education given with demonstration, + easily expressible colotrum noted. Baby is unswaddled and awake showing early signs of hunger. Mother was able to latch baby deeply with football hold to left breast with minimum assist. Lips widely flanged, + rhythmic sustained sucks noted. Mother taught techniques to properly aligned baby and to support breast and baby to facilitate deep latch. Mother stated she felt tugging and pulling, denied any pain or discomfort. Latched sustained with audible swallows on R breast for 25+ minutes and ongoing. Complete bf education given. Frequent/ responsive feeding and SSC encouraged. Mother verbalizes understanding of the above. All questions answered. Plan as above communicated to primary RN./multiparous mom

## 2023-01-15 NOTE — LACTATION INITIAL EVALUATION - LACTATION INTERVENTIONS
initiate/review safe skin-to-skin/initiate/review hand expression/initiate/review pumping guidelines and safe milk handling/initiate/review techniques for position and latch/reviewed components of an effective feeding and at least 8 effective feedings per day required/reviewed importance of monitoring infant diapers, the breastfeeding log, and minimum output each day/reviewed risks of unnecessary formula supplementation/reviewed risks of artificial nipples/reviewed strategies to transition to breastfeeding only/reviewed benefits and recommendations for rooming in/reviewed feeding on demand/by cue at least 8 times a day

## 2023-01-16 ENCOUNTER — TRANSCRIPTION ENCOUNTER (OUTPATIENT)
Age: 38
End: 2023-01-16

## 2023-01-16 RX ADMIN — Medication 600 MILLIGRAM(S): at 07:30

## 2023-01-16 RX ADMIN — Medication 975 MILLIGRAM(S): at 04:10

## 2023-01-16 RX ADMIN — Medication 600 MILLIGRAM(S): at 01:10

## 2023-01-16 RX ADMIN — SODIUM CHLORIDE 3 MILLILITER(S): 9 INJECTION INTRAMUSCULAR; INTRAVENOUS; SUBCUTANEOUS at 06:00

## 2023-01-16 RX ADMIN — Medication 975 MILLIGRAM(S): at 08:03

## 2023-01-16 RX ADMIN — Medication 600 MILLIGRAM(S): at 17:07

## 2023-01-16 RX ADMIN — Medication 1 TABLET(S): at 11:24

## 2023-01-16 RX ADMIN — Medication 600 MILLIGRAM(S): at 06:59

## 2023-01-16 RX ADMIN — Medication 975 MILLIGRAM(S): at 03:17

## 2023-01-16 RX ADMIN — SODIUM CHLORIDE 3 MILLILITER(S): 9 INJECTION INTRAMUSCULAR; INTRAVENOUS; SUBCUTANEOUS at 21:27

## 2023-01-16 RX ADMIN — Medication 975 MILLIGRAM(S): at 21:35

## 2023-01-16 RX ADMIN — Medication 600 MILLIGRAM(S): at 11:24

## 2023-01-16 RX ADMIN — Medication 975 MILLIGRAM(S): at 08:32

## 2023-01-16 RX ADMIN — Medication 600 MILLIGRAM(S): at 00:10

## 2023-01-16 RX ADMIN — Medication 975 MILLIGRAM(S): at 15:00

## 2023-01-16 RX ADMIN — Medication 975 MILLIGRAM(S): at 20:38

## 2023-01-16 RX ADMIN — Medication 975 MILLIGRAM(S): at 14:18

## 2023-01-16 RX ADMIN — Medication 600 MILLIGRAM(S): at 12:02

## 2023-01-16 NOTE — DISCHARGE NOTE OB - PATIENT PORTAL LINK FT
You can access the FollowMyHealth Patient Portal offered by [facility] by registering at the following website: [email or web address]. By joining [employer]’s FollowMyHealth portal, you will also be able to view your health information using other applications (apps) compatible with our system.

## 2023-01-16 NOTE — DISCHARGE NOTE OB - CONTRAINDICATIONS & PRECAUTIONS (SELECT ALL THAT APPLY)
Humira Pregnancy And Lactation Text: This medication is Pregnancy Category B and is considered safe during pregnancy. It is unknown if this medication is excreted in breast milk. Patient/surrogate refused vaccine...

## 2023-01-16 NOTE — PROGRESS NOTE ADULT - ATTENDING COMMENTS
Agree with the above. S/p VAVD, PPD#1, meeting PP milestones. Baby boy, declines circumcision. Continue routine postpartum advances and care with d/c home tomorrow.    Kelli Arevalo MD

## 2023-01-16 NOTE — DISCHARGE NOTE OB - HOSPITAL COURSE
Uncomplicated vaginal delivery and routine post partum recovery meeting all milestones appropriately Admitted with PROM.  VAVD, uneventful and postpartum course.

## 2023-01-16 NOTE — DISCHARGE NOTE OB - CARE PROVIDER_API CALL
Agusto Camacho)  Obstetrics and Gynecology  [address], [facility], [place] [address]  Phone: [phone]  Fax: [phone]  Follow Up Time:    Agusto Camacho)  Obstetrics and Gynecology  [address], [facility], [place] [address]  Phone: [phone]  Fax: [phone]  Follow Up Time: 2 months

## 2023-01-16 NOTE — DISCHARGE NOTE OB - MATERIALS PROVIDED
[facility] Screening Program/Guide to Postpartum Care/[facility] Hearing Screen Program/Shaken Baby Prevention Handout/Birth Certificate Instructions/Discharge Medication Information for Patients and Families Pocket Guide

## 2023-01-16 NOTE — DISCHARGE NOTE OB - CARE PLAN
Principal Discharge DX:	Postpartum state  Assessment and plan of treatment:	Vaginal delivery, meeting all postpartum milestones.  Follow up in 6 weeks.   1 Principal Discharge DX:	Forceps or vacuum extractor delivery  Assessment and plan of treatment:	Vacuum-assisted vaginal delivery, meeting all postpartum milestones.  Please follow-up with your OB doctor within 6 weeks.  You can resume a regular diet at home and may continue your prenatal vitamins as directed.  Please place nothing in the vagina for 6 weeks (no tampons, sex, douching, tub baths, swimming pools, etc).  If you have severe headaches and/or vision changes, heavy bleeding, or chest pain, please call your provider or go to the nearest Emergency Department.  Please call your OB with any signs of symptoms of infection including fever > 100.4 degrees, severe pain, malodorous vaginal discharge or heavy bleeding requiring more than 1-2 pads/hour.  You can take Motrin 600mg orally every 6 hours for pain as needed.

## 2023-01-16 NOTE — DISCHARGE NOTE OB - PLAN OF CARE
Vaginal delivery, meeting all postpartum milestones.  Follow up in 6 weeks. Vacuum-assisted vaginal delivery, meeting all postpartum milestones.  Please follow-up with your OB doctor within 6 weeks.  You can resume a regular diet at home and may continue your prenatal vitamins as directed.  Please place nothing in the vagina for 6 weeks (no tampons, sex, douching, tub baths, swimming pools, etc).  If you have severe headaches and/or vision changes, heavy bleeding, or chest pain, please call your provider or go to the nearest Emergency Department.  Please call your OB with any signs of symptoms of infection including fever > 100.4 degrees, severe pain, malodorous vaginal discharge or heavy bleeding requiring more than 1-2 pads/hour.  You can take Motrin 600mg orally every 6 hours for pain as needed.

## 2023-01-16 NOTE — DISCHARGE NOTE OB - NS MD DC FALL RISK RISK
For information on Fall & Injury Prevention, visit: [email or web address] OR  [email or web address] OR  https://www.cdc.gov/steadi/patient.html

## 2023-01-16 NOTE — DISCHARGE NOTE OB - NSDCQMSTAIRS_GEN_ALL_CORE
Bedside and Verbal shift change report given to Kecia Florez (oncoming nurse) by Lisandra Grant (offgoing nurse). Report included the following information SBAR, Intake/Output, MAR, Recent Results, Cardiac Rhythm NSR and Alarm Parameters . No

## 2023-01-17 VITALS
DIASTOLIC BLOOD PRESSURE: 75 MMHG | TEMPERATURE: 98 F | OXYGEN SATURATION: 98 % | RESPIRATION RATE: 17 BRPM | HEART RATE: 84 BPM | SYSTOLIC BLOOD PRESSURE: 113 MMHG

## 2023-01-17 PROCEDURE — 85025 COMPLETE CBC W/AUTO DIFF WBC: CPT

## 2023-01-17 PROCEDURE — 86769 SARS-COV-2 COVID-19 ANTIBODY: CPT

## 2023-01-17 PROCEDURE — 87635 SARS-COV-2 COVID-19 AMP PRB: CPT

## 2023-01-17 PROCEDURE — 86900 BLOOD TYPING SEROLOGIC ABO: CPT

## 2023-01-17 PROCEDURE — 86850 RBC ANTIBODY SCREEN: CPT

## 2023-01-17 PROCEDURE — 36415 COLL VENOUS BLD VENIPUNCTURE: CPT

## 2023-01-17 PROCEDURE — 99214 OFFICE O/P EST MOD 30 MIN: CPT

## 2023-01-17 PROCEDURE — 59050 FETAL MONITOR W/REPORT: CPT

## 2023-01-17 PROCEDURE — 86780 TREPONEMA PALLIDUM: CPT

## 2023-01-17 PROCEDURE — 86901 BLOOD TYPING SEROLOGIC RH(D): CPT

## 2023-01-17 RX ORDER — IBUPROFEN 200 MG
1 TABLET ORAL
Qty: 0 | Refills: 0 | DISCHARGE
Start: 2023-01-17

## 2023-01-17 RX ORDER — ACETAMINOPHEN 500 MG
3 TABLET ORAL
Qty: 0 | Refills: 0 | DISCHARGE
Start: 2023-01-17

## 2023-01-17 RX ORDER — BENZOCAINE 10 %
1 GEL (GRAM) MUCOUS MEMBRANE
Qty: 0 | Refills: 0 | DISCHARGE
Start: 2023-01-17

## 2023-01-17 RX ADMIN — Medication 975 MILLIGRAM(S): at 02:22

## 2023-01-17 RX ADMIN — SODIUM CHLORIDE 3 MILLILITER(S): 9 INJECTION INTRAMUSCULAR; INTRAVENOUS; SUBCUTANEOUS at 06:03

## 2023-01-17 RX ADMIN — Medication 975 MILLIGRAM(S): at 02:55

## 2023-01-17 RX ADMIN — Medication 600 MILLIGRAM(S): at 00:01

## 2023-01-17 RX ADMIN — Medication 975 MILLIGRAM(S): at 10:10

## 2023-01-17 RX ADMIN — Medication 600 MILLIGRAM(S): at 07:59

## 2023-01-17 RX ADMIN — Medication 975 MILLIGRAM(S): at 09:14

## 2023-01-17 RX ADMIN — Medication 600 MILLIGRAM(S): at 00:44

## 2023-01-17 RX ADMIN — Medication 600 MILLIGRAM(S): at 06:34

## 2023-01-17 NOTE — PROGRESS NOTE ADULT - SUBJECTIVE AND OBJECTIVE BOX
Patient evaluated at bedside this morning, resting comfortably in bed, no acute events overnight.  She reports pain is well controlled with tylenol and motrin.  Reports decrease in amount of vaginal bleeding.  She has been ambulating without assistance, voiding spontaneously.  Tolerating food well, without nausea/vomit.      Physical Exam:  T(C): 37 (01-16-23 @ 14:53), Max: 37 (01-16-23 @ 14:53)  HR: 83 (01-16-23 @ 14:53) (77 - 83)  BP: 125/84 (01-16-23 @ 14:53) (112/73 - 125/84)  RR: 17 (01-16-23 @ 14:53) (17 - 18)  SpO2: 97% (01-16-23 @ 14:53) (96% - 97%)    GA: NAD, patient is alert and oriented  Resp: No increased work of breathing, breathing comfortably on RA  Abd: Soft, nontender, nondistended, no rebound or guarding, uterus firm.  Extremities: no swelling or calf tenderness                          10.3   6.56  )-----------( 204      ( 14 Jan 2023 16:33 )             30.0           acetaminophen     Tablet .. 975 milliGRAM(s) Oral <User Schedule>  benzocaine 20%/menthol 0.5% Spray 1 Spray(s) Topical every 6 hours PRN  dibucaine 1% Ointment 1 Application(s) Topical every 6 hours PRN  diphenhydrAMINE 25 milliGRAM(s) Oral every 6 hours PRN  diphtheria/tetanus/pertussis (acellular) Vaccine (Adacel) 0.5 milliLiter(s) IntraMuscular once  hydrocortisone 1% Cream 1 Application(s) Topical every 6 hours PRN  ibuprofen  Tablet. 600 milliGRAM(s) Oral every 6 hours  lanolin Ointment 1 Application(s) Topical every 6 hours PRN  magnesium hydroxide Suspension 30 milliLiter(s) Oral two times a day PRN  oxyCODONE    IR 5 milliGRAM(s) Oral every 3 hours PRN  oxyCODONE    IR 5 milliGRAM(s) Oral once PRN  oxytocin Infusion 41.667 milliUNIT(s)/Min IV Continuous <Continuous>  pramoxine 1%/zinc 5% Cream 1 Application(s) Topical every 4 hours PRN  prenatal multivitamin 1 Tablet(s) Oral daily  simethicone 80 milliGRAM(s) Chew every 4 hours PRN  sodium chloride 0.9% lock flush 3 milliLiter(s) IV Push every 8 hours  witch hazel Pads 1 Application(s) Topical every 4 hours PRN  
Patient evaluated at bedside this morning, resting comfortable in bed, no acute events overnight.  She reports pain is well controlled with tylenol and motrin.  She denies headache, dizziness, chest pain, palpitations, shortness of breath, nausea, vomiting, fever, chills, heavy vaginal bleeding. She has been ambulating without assistance, voiding spontaneously.  Tolerating food well, without nausea/vomit.      Physical Exam:  T(C): --  HR: --  BP: --  RR: --  SpO2: --    GA: NAD, A&O x 3  Pulm: no increased work of breathing  Abd: soft, nontender, nondistended, no rebound or guarding, uterus firm.  Extremities: no swelling or calf tenderness            acetaminophen     Tablet .. 975 milliGRAM(s) Oral <User Schedule>  benzocaine 20%/menthol 0.5% Spray 1 Spray(s) Topical every 6 hours PRN  dibucaine 1% Ointment 1 Application(s) Topical every 6 hours PRN  diphenhydrAMINE 25 milliGRAM(s) Oral every 6 hours PRN  diphtheria/tetanus/pertussis (acellular) Vaccine (Adacel) 0.5 milliLiter(s) IntraMuscular once  hydrocortisone 1% Cream 1 Application(s) Topical every 6 hours PRN  ibuprofen  Tablet. 600 milliGRAM(s) Oral every 6 hours  lanolin Ointment 1 Application(s) Topical every 6 hours PRN  magnesium hydroxide Suspension 30 milliLiter(s) Oral two times a day PRN  oxyCODONE    IR 5 milliGRAM(s) Oral every 3 hours PRN  oxyCODONE    IR 5 milliGRAM(s) Oral once PRN  oxytocin Infusion 41.667 milliUNIT(s)/Min IV Continuous <Continuous>  pramoxine 1%/zinc 5% Cream 1 Application(s) Topical every 4 hours PRN  prenatal multivitamin 1 Tablet(s) Oral daily  simethicone 80 milliGRAM(s) Chew every 4 hours PRN  sodium chloride 0.9% lock flush 3 milliLiter(s) IV Push every 8 hours  witch hazel Pads 1 Application(s) Topical every 4 hours PRN

## 2023-01-17 NOTE — PROGRESS NOTE ADULT - ASSESSMENT
A/P 37y s/p , PPD#2 , stable, meeting postpartum milestones   - Pain: well controlled on tylenol/motrin  - GI: Tolerating regular diet  - : urinating without difficulty/pain  - DVT prophylaxis: ambulating frequently  - Dispo: PPD 2, unless otherwise specified    
A/P 37y s/p , PPD#1, stable, meeting postpartum milestones   - Pain: well controlled on tylenol/motrin  - GI: Tolerating regular diet  - : urinating without difficulty/pain  - DVT prophylaxis: ambulating frequently  - Dispo: PPD 2, unless otherwise specified

## 2023-01-23 DIAGNOSIS — Z3A.38 38 WEEKS GESTATION OF PREGNANCY: ICD-10-CM

## 2023-01-23 DIAGNOSIS — O09.523 SUPERVISION OF ELDERLY MULTIGRAVIDA, THIRD TRIMESTER: ICD-10-CM

## 2023-01-23 DIAGNOSIS — R00.1 BRADYCARDIA, UNSPECIFIED: ICD-10-CM

## 2023-01-23 DIAGNOSIS — Z34.83 ENCOUNTER FOR SUPERVISION OF OTHER NORMAL PREGNANCY, THIRD TRIMESTER: ICD-10-CM

## 2023-01-23 DIAGNOSIS — B00.9 HERPESVIRAL INFECTION, UNSPECIFIED: ICD-10-CM

## 2023-01-23 DIAGNOSIS — Z98.2 PRESENCE OF CEREBROSPINAL FLUID DRAINAGE DEVICE: ICD-10-CM

## 2023-01-23 DIAGNOSIS — G93.2 BENIGN INTRACRANIAL HYPERTENSION: ICD-10-CM

## 2023-01-30 ENCOUNTER — NON-APPOINTMENT (OUTPATIENT)
Age: 38
End: 2023-01-30

## 2023-02-03 ENCOUNTER — NON-APPOINTMENT (OUTPATIENT)
Age: 38
End: 2023-02-03

## 2023-03-10 ENCOUNTER — APPOINTMENT (OUTPATIENT)
Dept: OBGYN | Facility: CLINIC | Age: 38
End: 2023-03-10
